# Patient Record
Sex: FEMALE | Race: WHITE | URBAN - METROPOLITAN AREA
[De-identification: names, ages, dates, MRNs, and addresses within clinical notes are randomized per-mention and may not be internally consistent; named-entity substitution may affect disease eponyms.]

---

## 2022-03-17 ENCOUNTER — OFFICE (OUTPATIENT)
Dept: URBAN - METROPOLITAN AREA CLINIC 105 | Facility: CLINIC | Age: 87
End: 2022-03-17
Payer: COMMERCIAL

## 2022-03-17 VITALS
WEIGHT: 130 LBS | SYSTOLIC BLOOD PRESSURE: 155 MMHG | HEART RATE: 75 BPM | DIASTOLIC BLOOD PRESSURE: 73 MMHG | HEIGHT: 70 IN

## 2022-03-17 DIAGNOSIS — R07.9 CHEST PAIN, UNSPECIFIED: ICD-10-CM

## 2022-03-17 DIAGNOSIS — R11.0 NAUSEA: ICD-10-CM

## 2022-03-17 PROCEDURE — 99212 OFFICE O/P EST SF 10 MIN: CPT

## 2022-03-17 RX ORDER — AMITRIPTYLINE HYDROCHLORIDE 25 MG/1
25 TABLET, FILM COATED ORAL
Qty: 90 | Refills: 1 | Status: ACTIVE

## 2023-08-02 ENCOUNTER — OFFICE (OUTPATIENT)
Dept: URBAN - METROPOLITAN AREA CLINIC 105 | Facility: CLINIC | Age: 88
End: 2023-08-02
Payer: COMMERCIAL

## 2023-08-02 VITALS
DIASTOLIC BLOOD PRESSURE: 70 MMHG | OXYGEN SATURATION: 96 % | HEIGHT: 70 IN | WEIGHT: 126 LBS | SYSTOLIC BLOOD PRESSURE: 115 MMHG | HEART RATE: 67 BPM

## 2023-08-02 DIAGNOSIS — K22.89 OTHER SPECIFIED DISEASE OF ESOPHAGUS: ICD-10-CM

## 2023-08-02 DIAGNOSIS — F45.8 OTHER SOMATOFORM DISORDERS: ICD-10-CM

## 2023-08-02 DIAGNOSIS — F41.9 ANXIETY DISORDER, UNSPECIFIED: ICD-10-CM

## 2023-08-02 DIAGNOSIS — K22.4 DYSKINESIA OF ESOPHAGUS: ICD-10-CM

## 2023-08-02 PROCEDURE — 99214 OFFICE O/P EST MOD 30 MIN: CPT

## 2023-08-02 NOTE — SERVICENOTES
avoid extreme hot or cold food/liquids
trial of altoid mint before you eat
call if you want esophagram
chew well, eat slow and alternate solid with liquid
can trial IB abdon

## 2023-08-02 NOTE — SERVICEHPINOTES
Isaura Goodwin   is seen today for a follow-up visit.  Patient previously followed with Dr. Salas. History of dysphagia, presbyesophagus with poor motility, history of nausea. Doing well in the past on amitriptyline 25 mg daily.Per chart review pantoprazole caused upset stomach. She suffers from anxiety, every morning she wakes up and has a milium thoughts going through her brain. Her PCP has tried many anxiety lytics but they all bother her esophagus. She brings previous records with her today.   
br
br   esophagram 2006: No evidence of reflux, extrinsic effacement of the cervical esophagus due to enlargement of the thyroid gland or possibility of other neck mass, further evaluation with CT scan recommended, 12 mm barium tablet passed through without difficulty. Intermittent tertiary contractions, intermittent to and fro contraction of the esophagus which is not propulsive of the contrast within the esophagus and there are intermittent tertiary contractions.
br
br follow-up CT scan dated 7- showed 2 small cervical nodes bilaterally with degenerative disc narrowing, normal thyroid, no masses. No neck masses with normal epiglottic structures  
br
brCurrently she continues on amitriptyline nightly, she is also on gabapentin. When she eats at times, after a few bites of her food she reports her esophagus "will close up ". She feels it is spasm-like. Sometimes she needs to spit out phlegm. After a few moments food will slowly go down. She reports she has a good appetite, does not have any GERD symptoms. Liquids cause no issue it is more so the solids. This worsens her anxiety. Weight has been stable. No abdominal pain. No vomiting. In the past, she was told previous GI did not want to perform any EGDs.